# Patient Record
(demographics unavailable — no encounter records)

---

## 2024-10-07 NOTE — PHYSICAL EXAM
[Chaperone Present] : A chaperone was present in the examining room during all aspects of the physical examination [[id number]] : A chaperone was present during the pelvic exam. [FreeTextEntry2] : Yolanda Ott [Appropriately responsive] : appropriately responsive [Alert] : alert [No Acute Distress] : no acute distress [Soft] : soft [Non-tender] : non-tender [Non-distended] : non-distended [No Lesions] : no lesions [No Mass] : no mass [Oriented x3] : oriented x3 [Examination Of The Breasts] : a normal appearance [No Masses] : no breast masses were palpable [Labia Majora] : normal [Labia Minora] : normal [Normal] : normal [Uterine Adnexae] : normal

## 2024-10-07 NOTE — HISTORY OF PRESENT ILLNESS
[Patient reported mammogram was normal] : Patient reported mammogram was normal [Patient reported breast sonogram was normal] : Patient reported breast sonogram was normal [Patient reported PAP Smear was normal] : Patient reported PAP Smear was normal [FreeTextEntry1] : 40 y/o G female, LMP: 2/23/2022, presents for annual GYN visit.  Patient denies any GYN complaints.  Menstrual Cycle: regular, monthly, mild pain and bleeding. Sexual Activity: monogamous with  Contraception: partner vasectomy Social/Mental Health: reports social ETOH, denies tobacco or any illicit drug use. Denies depression, anxiety, thoughts of personal harm or suicidal ideation.  ROS: Denies fever/chills, HA, Cough/sore throat, CP, SOB, N/V, Diarrhea/Constipation, Pelvic pain, Urinary frequency/urgency/incontinence, irregular vaginal bleeding, discharge or irritation.  Medical History GYN HX: denies PMH: MVP PSH: denies Meds: denies Allergies: NKDA [Mammogramdate] : 10/2023 [BreastSonogramDate] : 10/2023 [PapSmeardate] : 9/2023 [Normal Amount/Duration] :  normal amount and duration [Patient refuses STI testing] : Patient refuses STI testing